# Patient Record
Sex: MALE | Race: WHITE
[De-identification: names, ages, dates, MRNs, and addresses within clinical notes are randomized per-mention and may not be internally consistent; named-entity substitution may affect disease eponyms.]

---

## 2020-07-21 LAB
BUN BLD-MCNC: 12 MG/DL (ref 7–18)
GLUCOSE SERPLBLD-MCNC: 90 MG/DL (ref 74–106)
HCT VFR BLD CALC: 43.2 % (ref 39.6–49)
INR BLD: 0.96
LYMPHOCYTES # SPEC AUTO: 1.6 K/UL (ref 0.7–4.9)
PMV BLD: 11 FL (ref 7.6–11.3)
POTASSIUM SERPL-SCNC: 4.9 MMOL/L (ref 3.5–5.1)
RBC # BLD: 4.44 M/UL (ref 4.33–5.43)

## 2020-07-21 NOTE — EKG
Test Date:    2020-07-21               Test Time:    11:29:52

Technician:   NATHANIEL                                     

                                                     

MEASUREMENT RESULTS:                                       

Intervals:                                           

Rate:         48                                     

AL:           174                                    

QRSD:         86                                     

QT:           422                                    

QTc:          376                                    

Axis:                                                

P:            63                                     

AL:           174                                    

QRS:          -8                                     

T:            55                                     

                                                     

INTERPRETIVE STATEMENTS:                                       

                                                     

Sinus bradycardia

Otherwise normal ECG

Compared to ECG 10/14/2017 09:56:12

Left-axis deviation no longer present



Electronically Signed On 07-21-20 15:38:04 CDT by Abdon Mccain

## 2020-07-21 NOTE — RAD REPORT
EXAM DESCRIPTION:  RAD - Chest Pa And Lat (2 Views) - 7/21/2020 11:52 am

 

CLINICAL HISTORY:  pre op, pending heart catheterization

 

COMPARISON:  CT chest October 2017, two view chest October 2017

 

TECHNIQUE:  Frontal and lateral views of the chest were obtained.

 

FINDINGS:  The lungs are clear of failure or infiltrate. Small nodule is seen in the lower right lung
 field superimposed on the anterior right fifth rib. This is not clearly seen on the prior studies.  
Long-term significance is doubtful. Re-evaluation chest film in 4-6 months could be performed to dete
rmine if this is a persistent finding.

 

 Heart size is normal and central vasculature is within normal limits.  No pleural effusion or pneumo
thorax seen.  No acute bony finding noted.  No aortic abnormality.

 

IMPRESSION:  No infiltrate, failure or acute cardiopulmonary finding.

 

Small nodule lower right lung field warrants re-evaluation chest film in 4-6 months.

## 2020-07-23 ENCOUNTER — HOSPITAL ENCOUNTER (OUTPATIENT)
Dept: HOSPITAL 97 - CCL | Age: 75
Discharge: HOME | End: 2020-07-23
Attending: INTERNAL MEDICINE
Payer: COMMERCIAL

## 2020-07-23 VITALS — OXYGEN SATURATION: 97 % | SYSTOLIC BLOOD PRESSURE: 138 MMHG | DIASTOLIC BLOOD PRESSURE: 71 MMHG

## 2020-07-23 VITALS — TEMPERATURE: 97.2 F

## 2020-07-23 DIAGNOSIS — E78.5: ICD-10-CM

## 2020-07-23 DIAGNOSIS — Z20.828: ICD-10-CM

## 2020-07-23 DIAGNOSIS — R94.39: Primary | ICD-10-CM

## 2020-07-23 DIAGNOSIS — R07.9: ICD-10-CM

## 2020-07-23 DIAGNOSIS — I34.1: ICD-10-CM

## 2020-07-23 DIAGNOSIS — R06.02: ICD-10-CM

## 2020-07-23 DIAGNOSIS — I10: ICD-10-CM

## 2020-07-23 PROCEDURE — 85025 COMPLETE CBC W/AUTO DIFF WBC: CPT

## 2020-07-23 PROCEDURE — 93454 CORONARY ARTERY ANGIO S&I: CPT

## 2020-07-23 PROCEDURE — 93005 ELECTROCARDIOGRAM TRACING: CPT

## 2020-07-23 PROCEDURE — 36415 COLL VENOUS BLD VENIPUNCTURE: CPT

## 2020-07-23 PROCEDURE — 85730 THROMBOPLASTIN TIME PARTIAL: CPT

## 2020-07-23 PROCEDURE — 80048 BASIC METABOLIC PNL TOTAL CA: CPT

## 2020-07-23 PROCEDURE — 71046 X-RAY EXAM CHEST 2 VIEWS: CPT

## 2020-07-23 PROCEDURE — 85610 PROTHROMBIN TIME: CPT

## 2020-07-23 NOTE — OP
Date of Procedure:  07/23/2020



Surgeon:  Abdon Mccain MD



Assistant:  Junior Cedeno. 



The patient will be at bedrest for 2 hours.  He will go home after that and then see me in the office
 in 2 weeks.



Procedure:  Left heart catheterization, selective coronary arteriogram.



Indication:  Chest pain and positive stress test.



Description Of Procedure:  Mr. Cordero is 74, was brought to the cath lab today as an outpatient, pr
epped and draped in the routine sterile fashion.  He was given Versed for sedation.  10 cc of xylocai
ne were used to anesthetize the right common femoral artery area.  Using the Seldinger technique, we 
put a 6-Algerian sheath there.  Angiography in that area was normal.  Angio-Seal was used to close the 
case.  Geneva catheter left and right were used to cannulate the left main and right main respective
ly.  His left main circumflex and LAD were normal.  He was left dominant.  The RCA was normal, but ve
ry small and nondominant.  A 6-Algerian catheters and sheath were used.  There were no complications.



Estimated Blood Loss:  5 mL.



Postoperative Diagnosis:  Positive stress test, atypical chest pain, normal heart catheterization.



Plan:  Plan is for medical therapy.



Anesthesia:  Total conscious sedation was 30 minutes.





NB/PEYTON

DD:  07/23/2020 08:54:33Voice ID:  [id number]

DT:  07/23/2020 09:25:09Report ID:  [id number]